# Patient Record
Sex: FEMALE | Race: BLACK OR AFRICAN AMERICAN | Employment: FULL TIME | URBAN - METROPOLITAN AREA
[De-identification: names, ages, dates, MRNs, and addresses within clinical notes are randomized per-mention and may not be internally consistent; named-entity substitution may affect disease eponyms.]

---

## 2019-12-10 ENCOUNTER — APPOINTMENT (OUTPATIENT)
Dept: CT IMAGING | Age: 34
End: 2019-12-10
Attending: EMERGENCY MEDICINE
Payer: SELF-PAY

## 2019-12-10 ENCOUNTER — HOSPITAL ENCOUNTER (EMERGENCY)
Age: 34
Discharge: HOME | End: 2019-12-11
Attending: EMERGENCY MEDICINE
Payer: SELF-PAY

## 2019-12-10 ENCOUNTER — APPOINTMENT (OUTPATIENT)
Dept: GENERAL RADIOLOGY | Age: 34
End: 2019-12-10
Attending: NURSE PRACTITIONER
Payer: SELF-PAY

## 2019-12-10 DIAGNOSIS — R07.9 CHEST PAIN, UNSPECIFIED TYPE: Primary | ICD-10-CM

## 2019-12-10 LAB
ALBUMIN SERPL-MCNC: 3 G/DL (ref 3.4–5)
ALBUMIN/GLOB SERPL: 0.8 {RATIO} (ref 0.8–1.7)
ALP SERPL-CCNC: 48 U/L (ref 45–117)
ALT SERPL-CCNC: 17 U/L (ref 13–56)
ANION GAP SERPL CALC-SCNC: 4 MMOL/L (ref 3–18)
AST SERPL-CCNC: 16 U/L (ref 10–38)
BASOPHILS # BLD: 0 K/UL (ref 0–0.1)
BASOPHILS NFR BLD: 0 % (ref 0–2)
BILIRUB SERPL-MCNC: 0.3 MG/DL (ref 0.2–1)
BUN SERPL-MCNC: 8 MG/DL (ref 7–18)
BUN/CREAT SERPL: 9 (ref 12–20)
CALCIUM SERPL-MCNC: 8.3 MG/DL (ref 8.5–10.1)
CHLORIDE SERPL-SCNC: 106 MMOL/L (ref 100–111)
CO2 SERPL-SCNC: 27 MMOL/L (ref 21–32)
CREAT SERPL-MCNC: 0.89 MG/DL (ref 0.6–1.3)
DIFFERENTIAL METHOD BLD: ABNORMAL
EOSINOPHIL # BLD: 0 K/UL (ref 0–0.4)
EOSINOPHIL NFR BLD: 0 % (ref 0–5)
ERYTHROCYTE [DISTWIDTH] IN BLOOD BY AUTOMATED COUNT: 16.8 % (ref 11.6–14.5)
GLOBULIN SER CALC-MCNC: 4 G/DL (ref 2–4)
GLUCOSE SERPL-MCNC: 101 MG/DL (ref 74–99)
HCG SERPL QL: NEGATIVE
HCT VFR BLD AUTO: 40.9 % (ref 35–45)
HGB BLD-MCNC: 13 G/DL (ref 12–16)
LACTATE BLD-SCNC: 0.84 MMOL/L (ref 0.4–2)
LYMPHOCYTES # BLD: 1.5 K/UL (ref 0.9–3.6)
LYMPHOCYTES NFR BLD: 25 % (ref 21–52)
MCH RBC QN AUTO: 23.6 PG (ref 24–34)
MCHC RBC AUTO-ENTMCNC: 31.8 G/DL (ref 31–37)
MCV RBC AUTO: 74.4 FL (ref 74–97)
MONOCYTES # BLD: 0.4 K/UL (ref 0.05–1.2)
MONOCYTES NFR BLD: 6 % (ref 3–10)
NEUTS SEG # BLD: 4.1 K/UL (ref 1.8–8)
NEUTS SEG NFR BLD: 69 % (ref 40–73)
PLATELET # BLD AUTO: 383 K/UL (ref 135–420)
PMV BLD AUTO: 9.3 FL (ref 9.2–11.8)
POTASSIUM SERPL-SCNC: 3.9 MMOL/L (ref 3.5–5.5)
PROT SERPL-MCNC: 7 G/DL (ref 6.4–8.2)
RBC # BLD AUTO: 5.5 M/UL (ref 4.2–5.3)
SODIUM SERPL-SCNC: 137 MMOL/L (ref 136–145)
WBC # BLD AUTO: 6.1 K/UL (ref 4.6–13.2)

## 2019-12-10 PROCEDURE — 87186 SC STD MICRODIL/AGAR DIL: CPT

## 2019-12-10 PROCEDURE — 84703 CHORIONIC GONADOTROPIN ASSAY: CPT

## 2019-12-10 PROCEDURE — 74011250636 HC RX REV CODE- 250/636: Performed by: EMERGENCY MEDICINE

## 2019-12-10 PROCEDURE — 80053 COMPREHEN METABOLIC PANEL: CPT

## 2019-12-10 PROCEDURE — 74011636320 HC RX REV CODE- 636/320: Performed by: EMERGENCY MEDICINE

## 2019-12-10 PROCEDURE — 87040 BLOOD CULTURE FOR BACTERIA: CPT

## 2019-12-10 PROCEDURE — 83605 ASSAY OF LACTIC ACID: CPT

## 2019-12-10 PROCEDURE — 71275 CT ANGIOGRAPHY CHEST: CPT

## 2019-12-10 PROCEDURE — 99285 EMERGENCY DEPT VISIT HI MDM: CPT

## 2019-12-10 PROCEDURE — 96374 THER/PROPH/DIAG INJ IV PUSH: CPT

## 2019-12-10 PROCEDURE — 87077 CULTURE AEROBIC IDENTIFY: CPT

## 2019-12-10 PROCEDURE — 96375 TX/PRO/DX INJ NEW DRUG ADDON: CPT

## 2019-12-10 PROCEDURE — 85025 COMPLETE CBC W/AUTO DIFF WBC: CPT

## 2019-12-10 PROCEDURE — 93005 ELECTROCARDIOGRAM TRACING: CPT

## 2019-12-10 PROCEDURE — 71046 X-RAY EXAM CHEST 2 VIEWS: CPT

## 2019-12-10 RX ORDER — KETOROLAC TROMETHAMINE 15 MG/ML
15 INJECTION, SOLUTION INTRAMUSCULAR; INTRAVENOUS
Status: COMPLETED | OUTPATIENT
Start: 2019-12-10 | End: 2019-12-10

## 2019-12-10 RX ORDER — MORPHINE SULFATE 4 MG/ML
4 INJECTION INTRAVENOUS
Status: COMPLETED | OUTPATIENT
Start: 2019-12-10 | End: 2019-12-10

## 2019-12-10 RX ADMIN — IOPAMIDOL 100 ML: 755 INJECTION, SOLUTION INTRAVENOUS at 22:30

## 2019-12-10 RX ADMIN — KETOROLAC TROMETHAMINE 15 MG: 15 INJECTION, SOLUTION INTRAMUSCULAR; INTRAVENOUS at 23:14

## 2019-12-10 RX ADMIN — MORPHINE SULFATE 4 MG: 4 INJECTION INTRAVENOUS at 21:06

## 2019-12-10 NOTE — LETTER
NOTIFICATION RETURN TO WORK / SCHOOL 
 
12/11/2019 5:37 PM 
 
Ms. Vic Muniz [place] [id number] [address] [address] To Whom It May Concern: 
 
Vic Muniz is currently under the care of [facility]. She will return to work/school on: 12/12/19 If there are questions or concerns please have the patient contact our office.  
 
 
 
Sincerely, 
 
Aurelia Robins MD

## 2019-12-11 ENCOUNTER — HOSPITAL ENCOUNTER (EMERGENCY)
Age: 34
Discharge: HOME | End: 2019-12-11
Attending: EMERGENCY MEDICINE

## 2019-12-11 VITALS
HEIGHT: 70 IN | BODY MASS INDEX: 41.95 KG/M2 | TEMPERATURE: 99.1 F | RESPIRATION RATE: 14 BRPM | HEART RATE: 89 BPM | OXYGEN SATURATION: 96 % | WEIGHT: 293 LBS | DIASTOLIC BLOOD PRESSURE: 84 MMHG | SYSTOLIC BLOOD PRESSURE: 131 MMHG

## 2019-12-11 VITALS
SYSTOLIC BLOOD PRESSURE: 131 MMHG | OXYGEN SATURATION: 99 % | TEMPERATURE: 99.1 F | RESPIRATION RATE: 14 BRPM | DIASTOLIC BLOOD PRESSURE: 81 MMHG | HEART RATE: 92 BPM

## 2019-12-11 PROCEDURE — 87040 BLOOD CULTURE FOR BACTERIA: CPT

## 2019-12-11 NOTE — ED TRIAGE NOTES
Patient arrived via triage stating \"I am having pain from my right neck to the bottom of my ribs. It hurts when I try to lay down and breath. \" Patient has a rash that covers her chest area.

## 2019-12-11 NOTE — ED PROVIDER NOTES
EMERGENCY DEPARTMENT HISTORY AND PHYSICAL EXAM    8:07 PM      Date: 12/10/2019  Patient Name: Mervin Ruffin    History of Presenting Illness     Chief Complaint   Patient presents with    Rib Pain    Shortness of Breath    Neck Pain         History Provided By: Patient    Additional History (Context): Mervin Ruffin is a 29 y.o. female with no known past medical history who presents with complaint of sharp, nonradiating, moderate right-sided chest pain from her shoulder to the bottom of her rib cage, primarily anteriorly. She feels short of breath when she lies down, pain is worse when she takes deep breaths. She denies any recent travel, recent illness. Denies any other associated symptoms. This pain is been present for about 4 days intermittently, but became worse today. PCP: None        Past History     Past Medical History:  No past medical history on file. Past Surgical History:  No past surgical history on file. Family History:  No family history on file. Social History:  Social History     Tobacco Use    Smoking status: Not on file   Substance Use Topics    Alcohol use: Not on file    Drug use: Not on file       Allergies:  No Known Allergies      Review of Systems       Review of Systems   Constitutional: Negative for activity change and appetite change. HENT: Negative for congestion. Eyes: Negative for visual disturbance. Respiratory: Positive for shortness of breath. Negative for cough. Cardiovascular: Positive for chest pain. Gastrointestinal: Negative for abdominal pain, diarrhea, nausea and vomiting. Genitourinary: Negative for dysuria. Musculoskeletal: Negative for arthralgias and myalgias. Skin: Negative for rash. Neurological: Negative for weakness and numbness.          Physical Exam     Visit Vitals  /78   Pulse 90   Temp 99.1 °F (37.3 °C)   Resp 20   Ht 5' 10\" (1.778 m)   Wt 154.2 kg (340 lb)   SpO2 96%   BMI 48.78 kg/m²         Physical Exam  Vitals signs and nursing note reviewed. Constitutional:       Appearance: She is well-developed. HENT:      Head: Normocephalic and atraumatic. Eyes:      Conjunctiva/sclera: Conjunctivae normal.   Neck:      Musculoskeletal: Normal range of motion and neck supple. Vascular: No JVD. Cardiovascular:      Rate and Rhythm: Regular rhythm. Tachycardia present. Heart sounds: Normal heart sounds. No murmur. Pulmonary:      Effort: Pulmonary effort is normal.      Breath sounds: Normal breath sounds. Abdominal:      General: Bowel sounds are normal. There is no distension. Palpations: Abdomen is soft. Tenderness: There is no tenderness. Musculoskeletal: Normal range of motion. General: No deformity. Lymphadenopathy:      Cervical: No cervical adenopathy. Skin:     General: Skin is warm and dry. Findings: Rash present. Comments: Urticarial type rash over the right shoulder   Neurological:      Mental Status: She is alert and oriented to person, place, and time.       Coordination: Coordination normal.           Diagnostic Study Results     Labs -  Recent Results (from the past 12 hour(s))   EKG, 12 LEAD, INITIAL    Collection Time: 12/10/19  7:58 PM   Result Value Ref Range    Ventricular Rate 107 BPM    Atrial Rate 107 BPM    P-R Interval 166 ms    QRS Duration 78 ms    Q-T Interval 332 ms    QTC Calculation (Bezet) 443 ms    Calculated P Axis 31 degrees    Calculated R Axis 22 degrees    Calculated T Axis 21 degrees    Diagnosis       Sinus tachycardia  Possible Left atrial enlargement  Borderline ECG  No previous ECGs available     CBC WITH AUTOMATED DIFF    Collection Time: 12/10/19  8:04 PM   Result Value Ref Range    WBC 6.1 4.6 - 13.2 K/uL    RBC 5.50 (H) 4.20 - 5.30 M/uL    HGB 13.0 12.0 - 16.0 g/dL    HCT 40.9 35.0 - 45.0 %    MCV 74.4 74.0 - 97.0 FL    MCH 23.6 (L) 24.0 - 34.0 PG    MCHC 31.8 31.0 - 37.0 g/dL    RDW 16.8 (H) 11.6 - 14.5 %    PLATELET 729 135 - 420 K/uL    MPV 9.3 9.2 - 11.8 FL    NEUTROPHILS 69 40 - 73 %    LYMPHOCYTES 25 21 - 52 %    MONOCYTES 6 3 - 10 %    EOSINOPHILS 0 0 - 5 %    BASOPHILS 0 0 - 2 %    ABS. NEUTROPHILS 4.1 1.8 - 8.0 K/UL    ABS. LYMPHOCYTES 1.5 0.9 - 3.6 K/UL    ABS. MONOCYTES 0.4 0.05 - 1.2 K/UL    ABS. EOSINOPHILS 0.0 0.0 - 0.4 K/UL    ABS. BASOPHILS 0.0 0.0 - 0.1 K/UL    DF AUTOMATED     METABOLIC PANEL, COMPREHENSIVE    Collection Time: 12/10/19  8:04 PM   Result Value Ref Range    Sodium 137 136 - 145 mmol/L    Potassium 3.9 3.5 - 5.5 mmol/L    Chloride 106 100 - 111 mmol/L    CO2 27 21 - 32 mmol/L    Anion gap 4 3.0 - 18 mmol/L    Glucose 101 (H) 74 - 99 mg/dL    BUN 8 7.0 - 18 MG/DL    Creatinine 0.89 0.6 - 1.3 MG/DL    BUN/Creatinine ratio 9 (L) 12 - 20      GFR est AA >60 >60 ml/min/1.73m2    GFR est non-AA >60 >60 ml/min/1.73m2    Calcium 8.3 (L) 8.5 - 10.1 MG/DL    Bilirubin, total 0.3 0.2 - 1.0 MG/DL    ALT (SGPT) 17 13 - 56 U/L    AST (SGOT) 16 10 - 38 U/L    Alk. phosphatase 48 45 - 117 U/L    Protein, total 7.0 6.4 - 8.2 g/dL    Albumin 3.0 (L) 3.4 - 5.0 g/dL    Globulin 4.0 2.0 - 4.0 g/dL    A-G Ratio 0.8 0.8 - 1.7     HCG QL SERUM    Collection Time: 12/10/19  8:04 PM   Result Value Ref Range    HCG, Ql. NEGATIVE  NEG     POC LACTIC ACID    Collection Time: 12/10/19  8:06 PM   Result Value Ref Range    Lactic Acid (POC) 0.84 0.40 - 2.00 mmol/L       Radiologic Studies -   CTA CHEST W OR W WO CONT   Final Result   IMPRESSION:      No pulmonary embolism. Dependent densities at the lung bases probably atelectasis. XR CHEST PA LAT   Final Result   IMPRESSION:      No clearly acute findings. See details above. Medical Decision Making   I am the first provider for this patient. I reviewed the vital signs, available nursing notes, past medical history, past surgical history, family history and social history. Vital Signs-Reviewed the patient's vital signs.     EKG:  Normal sinus rhythm, rate 107, , QTc 443. Subtle Q waves and inverted T waves in lead III no acute ST or T wave changes, no STEMI. Records Reviewed: Nursing Notes (Time of Review: 8:07 PM)      Provider Notes (Medical Decision Making):   Magy Jimenez is a 29 y.o. female with no known past medical history who presents with complaint of sharp, nonradiating, moderate right-sided chest pain from her shoulder to the bottom of her rib cage, primarily anteriorly. She feels short of breath when she lies down, pain is worse when she takes deep breaths. She denies any recent travel, recent illness. Denies any other associated symptoms. This pain is been present for about 4 days intermittently, but became worse today. Patient is tachycardic. Differential Diagnosis: Will consider viral respiratory illness, pneumonia, PE, pneumothorax, anemia, pleural effusion, pulmonary edema. Given the history and exam, I have low suspicion for CHF, ACS, pulmonary hypertension, toxicity, pericardial effusion/tamponade, angioedema, anaphylaxis, airway obstruction, pneumomediastinum, dysrhythmia, neoplasm, intraabdominal process, ARDS. Testing: CTA chest, CBC, CMP, hCG  Treatments: IV morphine    Re-evaluations:  Patient studies were unremarkable, CTA negative for PE. With pain improvement, her heart rate has reduced below 100. The patient will be discharged home. Findings were discussed at length and questions were answered. Information on all newly prescribed medications was given. the patient was instructed to follow-up with her primary physician, or return to the Emergency Department with any worsened symptoms or concerns. Return precautions were given. Diagnosis     Clinical Impression:   1.  Chest pain, unspecified type        Disposition: Charge    Follow-up Information     Follow up With Specialties Details Why [address] [facility] EM[facility] Emergency Medicine  If symptoms worsen [address] [id number]  [phone]           Patient's Medications    No medications on file     _______________________________    Attestations:  Earlene Jaime MD acting as a scribe for and in the presence of Yodit Herrmann MD      December 11, 2019 at 12:27 AM       Provider Attestation:      I personally performed the services described in the documentation, reviewed the documentation, as recorded by the scribe in my presence, and it accurately and completely records my words and actions.  December 11, 2019 at 12:27 AM - Yodit Herrmann MD    _______________________________

## 2019-12-11 NOTE — ED NOTES
Right AC IV placement - 20gauge diffusix  Date/Time: 12/10/2019 10:00 PM  Performed by: Leopold Linger, PA  Authorized by: Leopold Linger, PA     Consent:     Consent obtained:  Verbal    Consent given by:  Patient    Risks discussed:  Pain  Indications:     Indications:  Need for CTA  Pre-procedure details:     Skin preparation:  ChloraPrep  Post-procedure details:     Patient tolerance of procedure: Tolerated well, no immediate complications    left VM with CT of line placement.  10:02 PM    gela [place]

## 2019-12-11 NOTE — ED NOTES
5:30 PM patient was seen last night she presents now requesting a work note. Chart reviewed we will give her a work note as per her request as a courtesy to Dr. Crispin Mullen while writing a work note for the patient I received a call from the lab stating she had positive blood cultures from last night gram-positive cocci in groups. Patient had some pleuritic chest pain she denies any fever or chills overall she is well-appearing. 6:13 PM patient is well-appearing will redraw cultures and discharge she has no complaints now I do not think she needs to be admitted at this time I think is likely contaminant we will give strict instructions to return for any new or worsening symptoms    He was reviewed there is dependent densities at the lung bases which was felt to be atelectasis.   Probably not pneumonia given that she only has pleuritic chest pain without fever or cough

## 2019-12-11 NOTE — DISCHARGE INSTRUCTIONS

## 2019-12-12 LAB
ATRIAL RATE: 107 BPM
CALCULATED P AXIS, ECG09: 31 DEGREES
CALCULATED R AXIS, ECG10: 22 DEGREES
CALCULATED T AXIS, ECG11: 21 DEGREES
DIAGNOSIS, 93000: NORMAL
P-R INTERVAL, ECG05: 166 MS
Q-T INTERVAL, ECG07: 332 MS
QRS DURATION, ECG06: 78 MS
QTC CALCULATION (BEZET), ECG08: 443 MS
VENTRICULAR RATE, ECG03: 107 BPM

## 2019-12-13 LAB
BACTERIA SPEC CULT: ABNORMAL
GRAM STN SPEC: ABNORMAL
GRAM STN SPEC: ABNORMAL
SERVICE CMNT-IMP: ABNORMAL

## 2019-12-16 LAB
BACTERIA SPEC CULT: NORMAL
SERVICE CMNT-IMP: NORMAL

## 2019-12-17 LAB
BACTERIA SPEC CULT: NORMAL
BACTERIA SPEC CULT: NORMAL
SERVICE CMNT-IMP: NORMAL
SERVICE CMNT-IMP: NORMAL

## 2022-02-22 PROBLEM — K21.9 GASTROESOPHAGEAL REFLUX DISEASE WITHOUT ESOPHAGITIS: Status: ACTIVE | Noted: 2021-10-12

## 2022-02-22 PROBLEM — R73.03 PREDIABETES: Status: ACTIVE | Noted: 2021-10-21

## 2022-02-22 PROBLEM — I87.2 PERIPHERAL VENOUS INSUFFICIENCY: Status: ACTIVE | Noted: 2021-10-12

## 2022-02-22 PROBLEM — E66.01 SEVERE OBESITY (HCC): Status: ACTIVE | Noted: 2021-10-12

## 2022-02-22 PROBLEM — B37.2 CANDIDAL INTERTRIGO: Status: ACTIVE | Noted: 2021-10-12

## 2022-02-22 PROBLEM — M15.9 GENERALIZED OSTEOARTHRITIS: Status: ACTIVE | Noted: 2021-10-12

## 2022-02-22 PROBLEM — R16.0 HEPATOMEGALY: Status: ACTIVE | Noted: 2021-10-12
